# Patient Record
Sex: FEMALE | Race: OTHER | NOT HISPANIC OR LATINO | URBAN - METROPOLITAN AREA
[De-identification: names, ages, dates, MRNs, and addresses within clinical notes are randomized per-mention and may not be internally consistent; named-entity substitution may affect disease eponyms.]

---

## 2022-08-03 ENCOUNTER — NEW PATIENT (OUTPATIENT)
Dept: URBAN - METROPOLITAN AREA CLINIC 101 | Facility: CLINIC | Age: 87
End: 2022-08-03

## 2022-08-03 DIAGNOSIS — H35.3221: ICD-10-CM

## 2022-08-03 DIAGNOSIS — H35.3113: ICD-10-CM

## 2022-08-03 DIAGNOSIS — H43.813: ICD-10-CM

## 2022-08-03 PROCEDURE — 92235 FLUORESCEIN ANGRPH MLTIFRAME: CPT

## 2022-08-03 PROCEDURE — PFS EYLEA PFS

## 2022-08-03 PROCEDURE — 92134 CPTRZ OPH DX IMG PST SGM RTA: CPT

## 2022-08-03 PROCEDURE — 99204 OFFICE O/P NEW MOD 45 MIN: CPT | Mod: 25

## 2022-08-03 PROCEDURE — 67028 INJECTION EYE DRUG: CPT

## 2022-08-03 PROCEDURE — 92250 FUNDUS PHOTOGRAPHY W/I&R: CPT | Mod: NC

## 2022-08-03 PROCEDURE — 92202 OPSCPY EXTND ON/MAC DRAW: CPT | Mod: 59

## 2022-08-03 ASSESSMENT — VISUAL ACUITY
OD_PH: 20/25
OS_SC: 20/350
OD_SC: 20/30-2

## 2022-08-03 ASSESSMENT — TONOMETRY
OD_IOP_MMHG: 14
OS_IOP_MMHG: 14

## 2022-09-01 ENCOUNTER — FOLLOW UP (OUTPATIENT)
Dept: URBAN - METROPOLITAN AREA CLINIC 101 | Facility: CLINIC | Age: 87
End: 2022-09-01

## 2022-09-01 DIAGNOSIS — H35.3113: ICD-10-CM

## 2022-09-01 DIAGNOSIS — H35.3221: ICD-10-CM

## 2022-09-01 PROCEDURE — 92134 CPTRZ OPH DX IMG PST SGM RTA: CPT

## 2022-09-01 PROCEDURE — 67028 INJECTION EYE DRUG: CPT

## 2022-09-01 PROCEDURE — 92202 OPSCPY EXTND ON/MAC DRAW: CPT | Mod: 59

## 2022-09-01 PROCEDURE — 92014 COMPRE OPH EXAM EST PT 1/>: CPT | Mod: 25

## 2022-09-01 PROCEDURE — PFS EYLEA PFS

## 2022-09-01 ASSESSMENT — VISUAL ACUITY
OS_SC: 20/40-1
OD_SC: 20/40-1

## 2022-09-01 ASSESSMENT — TONOMETRY
OD_IOP_MMHG: 13
OS_IOP_MMHG: 14

## 2022-09-29 ENCOUNTER — FOLLOW UP (OUTPATIENT)
Dept: URBAN - METROPOLITAN AREA CLINIC 101 | Facility: CLINIC | Age: 87
End: 2022-09-29

## 2022-09-29 DIAGNOSIS — H43.813: ICD-10-CM

## 2022-09-29 DIAGNOSIS — H35.3221: ICD-10-CM

## 2022-09-29 DIAGNOSIS — H35.3113: ICD-10-CM

## 2022-09-29 PROCEDURE — PFS EYLEA PFS

## 2022-09-29 PROCEDURE — 92014 COMPRE OPH EXAM EST PT 1/>: CPT | Mod: 25

## 2022-09-29 PROCEDURE — 92134 CPTRZ OPH DX IMG PST SGM RTA: CPT

## 2022-09-29 PROCEDURE — 67028 INJECTION EYE DRUG: CPT

## 2022-09-29 PROCEDURE — 92202 OPSCPY EXTND ON/MAC DRAW: CPT | Mod: 59

## 2022-09-29 ASSESSMENT — TONOMETRY
OS_IOP_MMHG: 15
OD_IOP_MMHG: 14

## 2022-09-29 ASSESSMENT — VISUAL ACUITY
OS_SC: 20/40-1
OD_SC: 20/30-2

## 2022-10-27 ENCOUNTER — FOLLOW UP (OUTPATIENT)
Dept: URBAN - METROPOLITAN AREA CLINIC 101 | Facility: CLINIC | Age: 87
End: 2022-10-27

## 2022-10-27 DIAGNOSIS — H35.3113: ICD-10-CM

## 2022-10-27 DIAGNOSIS — H35.3221: ICD-10-CM

## 2022-10-27 PROCEDURE — 67028 INJECTION EYE DRUG: CPT

## 2022-10-27 PROCEDURE — 92202 OPSCPY EXTND ON/MAC DRAW: CPT | Mod: 59

## 2022-10-27 PROCEDURE — 92134 CPTRZ OPH DX IMG PST SGM RTA: CPT

## 2022-10-27 PROCEDURE — 92014 COMPRE OPH EXAM EST PT 1/>: CPT | Mod: 25

## 2022-10-27 PROCEDURE — PFS EYLEA PFS

## 2022-10-27 ASSESSMENT — VISUAL ACUITY
OD_SC: 20/40-1
OS_SC: 20/40

## 2022-10-27 ASSESSMENT — TONOMETRY
OD_IOP_MMHG: 11
OS_IOP_MMHG: 12

## 2023-10-12 ENCOUNTER — PROBLEM (OUTPATIENT)
Dept: URBAN - METROPOLITAN AREA CLINIC 101 | Facility: CLINIC | Age: 88
End: 2023-10-12

## 2023-10-12 DIAGNOSIS — H35.3113: ICD-10-CM

## 2023-10-12 DIAGNOSIS — H35.3221: ICD-10-CM

## 2023-10-12 PROCEDURE — 67028 INJECTION EYE DRUG: CPT

## 2023-10-12 PROCEDURE — 92202 OPSCPY EXTND ON/MAC DRAW: CPT | Mod: 59

## 2023-10-12 PROCEDURE — 92014 COMPRE OPH EXAM EST PT 1/>: CPT | Mod: 25

## 2023-10-12 PROCEDURE — PFS EYLEA PFS: Mod: JZ

## 2023-10-12 PROCEDURE — 92134 CPTRZ OPH DX IMG PST SGM RTA: CPT

## 2023-10-12 ASSESSMENT — TONOMETRY
OD_IOP_MMHG: 14
OS_IOP_MMHG: 16

## 2023-10-12 ASSESSMENT — VISUAL ACUITY
OS_SC: CF 3FT
OD_SC: 20/40

## 2024-02-29 ENCOUNTER — APPOINTMENT (RX ONLY)
Dept: URBAN - METROPOLITAN AREA CLINIC 38 | Facility: CLINIC | Age: 89
Setting detail: DERMATOLOGY
End: 2024-02-29

## 2024-02-29 DIAGNOSIS — D22 MELANOCYTIC NEVI: ICD-10-CM

## 2024-02-29 DIAGNOSIS — L20.89 OTHER ATOPIC DERMATITIS: ICD-10-CM | Status: RESOLVING

## 2024-02-29 DIAGNOSIS — L82.1 OTHER SEBORRHEIC KERATOSIS: ICD-10-CM

## 2024-02-29 PROBLEM — D22.5 MELANOCYTIC NEVI OF TRUNK: Status: ACTIVE | Noted: 2024-02-29

## 2024-02-29 PROCEDURE — ? FULL BODY SKIN EXAM

## 2024-02-29 PROCEDURE — 99203 OFFICE O/P NEW LOW 30 MIN: CPT

## 2024-02-29 PROCEDURE — ? COUNSELING

## 2024-02-29 PROCEDURE — ? TREATMENT REGIMEN

## 2024-02-29 ASSESSMENT — LOCATION SIMPLE DESCRIPTION DERM
LOCATION SIMPLE: LEFT ANKLE
LOCATION SIMPLE: RIGHT UPPER BACK
LOCATION SIMPLE: RIGHT TEMPLE

## 2024-02-29 ASSESSMENT — SEVERITY ASSESSMENT 2020: SEVERITY 2020: MILD

## 2024-02-29 ASSESSMENT — LOCATION ZONE DERM
LOCATION ZONE: FACE
LOCATION ZONE: TRUNK
LOCATION ZONE: LEG

## 2024-02-29 ASSESSMENT — LOCATION DETAILED DESCRIPTION DERM
LOCATION DETAILED: LEFT ANTERIOR LATERAL MALLEOLUS
LOCATION DETAILED: RIGHT LATERAL TEMPLE
LOCATION DETAILED: RIGHT MEDIAL UPPER BACK

## 2024-02-29 NOTE — PROCEDURE: TREATMENT REGIMEN
Continue Regimen: Triamcinolone cream twice daily x1 week more then discontinue
Detail Level: Simple

## 2024-02-29 NOTE — HPI: SKIN LESION
What Type Of Note Output Would You Prefer (Optional)?: Standard Output
How Severe Is Your Skin Lesion?: moderate
Has Your Skin Lesion Been Treated?: not been treated
Is This A New Presentation, Or A Follow-Up?: Skin Lesion
Is This A New Presentation, Or A Follow-Up?: Skin Lesions
Additional History: Pt went to urgent care, they said it was allergic reaction, prescribed oral steroid and triamcinolone acetonide.